# Patient Record
Sex: MALE | URBAN - METROPOLITAN AREA
[De-identification: names, ages, dates, MRNs, and addresses within clinical notes are randomized per-mention and may not be internally consistent; named-entity substitution may affect disease eponyms.]

---

## 2020-11-06 ENCOUNTER — TELEPHONE (OUTPATIENT)
Dept: PRIMARY CARE CLINIC | Age: 60
End: 2020-11-06

## 2020-11-06 ENCOUNTER — VIRTUAL VISIT (OUTPATIENT)
Dept: PRIMARY CARE CLINIC | Age: 60
End: 2020-11-06

## 2020-11-06 ENCOUNTER — OFFICE VISIT (OUTPATIENT)
Dept: PRIMARY CARE CLINIC | Age: 60
End: 2020-11-06

## 2020-11-06 VITALS — BODY MASS INDEX: 40.6 KG/M2 | HEIGHT: 71 IN | WEIGHT: 290 LBS

## 2020-11-06 PROBLEM — Z13.220 SCREENING FOR LIPID DISORDERS: Status: ACTIVE | Noted: 2020-11-06

## 2020-11-06 PROBLEM — Z12.5 SCREENING FOR PROSTATE CANCER: Status: ACTIVE | Noted: 2020-11-06

## 2020-11-06 PROBLEM — E66.01 OBESITY, CLASS III, BMI 40-49.9 (MORBID OBESITY) (HCC): Status: ACTIVE | Noted: 2020-11-06

## 2020-11-06 PROBLEM — R63.5 WEIGHT GAIN: Status: ACTIVE | Noted: 2020-11-06

## 2020-11-06 PROBLEM — R73.03 PREDIABETES: Status: ACTIVE | Noted: 2018-06-07

## 2020-11-06 PROBLEM — I10 ESSENTIAL HYPERTENSION: Status: ACTIVE | Noted: 2020-11-06

## 2020-11-06 PROBLEM — R35.1 FREQUENT URINATION AT NIGHT: Status: ACTIVE | Noted: 2020-11-06

## 2020-11-06 PROCEDURE — 99204 OFFICE O/P NEW MOD 45 MIN: CPT | Performed by: INTERNAL MEDICINE

## 2020-11-06 PROCEDURE — 99211 OFF/OP EST MAY X REQ PHY/QHP: CPT | Performed by: NURSE PRACTITIONER

## 2020-11-06 RX ORDER — CHOLECALCIFEROL (VITAMIN D3) 125 MCG
1 CAPSULE ORAL DAILY
Qty: 30 TABLET | Refills: 5 | Status: SHIPPED | OUTPATIENT
Start: 2020-11-06

## 2020-11-06 RX ORDER — HYDROCHLOROTHIAZIDE 25 MG/1
25 TABLET ORAL DAILY
COMMUNITY
End: 2020-11-06 | Stop reason: SDUPTHER

## 2020-11-06 RX ORDER — HYDROCHLOROTHIAZIDE 25 MG/1
25 TABLET ORAL DAILY
Qty: 30 TABLET | Refills: 0 | Status: SHIPPED | OUTPATIENT
Start: 2020-11-06

## 2020-11-06 SDOH — HEALTH STABILITY: PHYSICAL HEALTH: ON AVERAGE, HOW MANY MINUTES DO YOU ENGAGE IN EXERCISE AT THIS LEVEL?: 0 MIN

## 2020-11-06 SDOH — HEALTH STABILITY: MENTAL HEALTH: HOW MANY STANDARD DRINKS CONTAINING ALCOHOL DO YOU HAVE ON A TYPICAL DAY?: 1 OR 2

## 2020-11-06 SDOH — HEALTH STABILITY: MENTAL HEALTH: HOW OFTEN DO YOU HAVE A DRINK CONTAINING ALCOHOL?: 2-3 TIMES A WEEK

## 2020-11-06 SDOH — HEALTH STABILITY: PHYSICAL HEALTH: ON AVERAGE, HOW MANY DAYS PER WEEK DO YOU ENGAGE IN MODERATE TO STRENUOUS EXERCISE (LIKE A BRISK WALK)?: 0 DAYS

## 2020-11-06 ASSESSMENT — ENCOUNTER SYMPTOMS
WHEEZING: 0
EYE PAIN: 0
EYE DISCHARGE: 0
SINUS PAIN: 0
BLOOD IN STOOL: 0
SHORTNESS OF BREATH: 0
VOMITING: 0
TROUBLE SWALLOWING: 0
ABDOMINAL PAIN: 0
SINUS PRESSURE: 0
SORE THROAT: 0
NAUSEA: 0
COUGH: 0
CHEST TIGHTNESS: 0
RHINORRHEA: 0

## 2020-11-06 ASSESSMENT — PATIENT HEALTH QUESTIONNAIRE - PHQ9
2. FEELING DOWN, DEPRESSED OR HOPELESS: 0
SUM OF ALL RESPONSES TO PHQ QUESTIONS 1-9: 0
SUM OF ALL RESPONSES TO PHQ9 QUESTIONS 1 & 2: 0
SUM OF ALL RESPONSES TO PHQ QUESTIONS 1-9: 0
1. LITTLE INTEREST OR PLEASURE IN DOING THINGS: 0
SUM OF ALL RESPONSES TO PHQ QUESTIONS 1-9: 0

## 2020-11-06 NOTE — TELEPHONE ENCOUNTER
I spoke with Filomena Boogie and he was not looking to establish as a new patient. His job wanted him to get a Covid test that he just started and he has yet to receive his insurance card and he thought the test was free. He claims that this office is too far for him to come.

## 2020-11-06 NOTE — PATIENT INSTRUCTIONS
Fasting blood work on 11/16/2020 and follow-up in office visit on 11/19/2020. Vitamin D 2000 units every day. Buy blood pressure machine with arm cuff large size due to his size and check blood pressure twice a day and bring it back to the office next time. Hypertension    Keep low sodium diet. Avoid potato chips, pretzels, sauerkraut , ham , sausage, munguia , salty crackers , salty french fries, salty nuts, salty popcorn etc.  Use only low sodium soups. No salted canned vegetables. Use fresh or frozen vegetables. No salt shaker use. Avoid weight gain. Regular exercise program.  Keep taking blood pressure medications regularly. If blood pressure staying above 130/85 or having side effects with blood pressure medications, then bring the blood pressure and pulse recorded twice a day to an appointment sooner than scheduled one. Must lose weight with lean meats and low-carb diet. Shingles vaccine and flu vaccine from pharmacy. Colonoscopy screening needs to be set up at next visit. Discussed use, benefit, and side effects of prescribed medications. Barriers to compliance discussed. All patient questions answered. Pt voiced understanding. IF YOU NEED A PRESCRIPTION REFILL, THEN PLEASE GIVE US THREE WORKING DAYS TO REFILL A PRESCRIPTION.

## 2020-11-06 NOTE — PROGRESS NOTES
ear pain, nosebleeds, rhinorrhea, sinus pressure, sinus pain, sore throat and trouble swallowing. Eyes: Negative for pain and discharge. Respiratory: Negative for cough, chest tightness, shortness of breath and wheezing. Cardiovascular: Negative for chest pain, palpitations and leg swelling. Gastrointestinal: Negative for abdominal pain, blood in stool, nausea and vomiting. Endocrine: Negative for polydipsia and polyphagia. Genitourinary: Negative for difficulty urinating, enuresis, flank pain and hematuria. Musculoskeletal: Negative for myalgias. Knee pain off and on   Skin: Negative for rash. Neurological: Negative for facial asymmetry, weakness, light-headedness, numbness and headaches. Psychiatric/Behavioral: Negative for confusion. No current outpatient medications on file prior to visit. No current facility-administered medications on file prior to visit. No Known Allergies  Past Medical History:   Diagnosis Date    HTN (hypertension) 2019     Past Surgical History:   Procedure Laterality Date    SHOULDER SURGERY  1999    cyst removal      Social History     Tobacco Use    Smoking status: Never Smoker    Smokeless tobacco: Never Used   Substance Use Topics    Alcohol use: Yes     Frequency: 2-3 times a week     Drinks per session: 1 or 2     Binge frequency: Never      Family History   Problem Relation Age of Onset    Other Mother 76        URI , PE    Stroke Father 80    High Blood Pressure Father         Vitals:    11/06/20 1204   Weight: 290 lb (131.5 kg)   Height: 5' 11\" (1.803 m)     Estimated body mass index is 40.45 kg/m² as calculated from the following:    Height as of this encounter: 5' 11\" (1.803 m). Weight as of this encounter: 290 lb (131.5 kg). Physical Exam  Constitutional:       Appearance: Normal appearance.    HENT:      Nose: Nose normal.   Pulmonary:      Effort: Pulmonary effort is normal.   Neurological:      General: No focal deficit present. Mental Status: He is alert. Psychiatric:         Mood and Affect: Mood normal.         Behavior: Behavior normal.         Thought Content: Thought content normal.         Judgment: Judgment normal.         ASSESSMENT/PLAN:  1. Essential hypertension    - hydroCHLOROthiazide (HYDRODIURIL) 25 MG tablet; Take 1 tablet by mouth daily  Dispense: 30 tablet; Refill: 0  - Basic Metabolic Panel; Future  - Microalbumin / Creatinine Urine Ratio; Future    2. Frequent urination at night  No caffeine late afternoon  - Urinalysis Reflex to Culture; Future    3. Obesity, Class III, BMI 40-49.9 (morbid obesity) ([facility] [address]  Must lose weight  - Hepatic Function Panel; Future    4. Prediabetes    - Hemoglobin A1C; Future  - Microalbumin / Creatinine Urine Ratio; Future    5. Vitamin D deficiency    - Cholecalciferol (VITAMIN D3) 50 MCG (2000 UT) TABS; Take 1 tablet by mouth daily  Dispense: 30 tablet; Refill: 5  - VITAMIN D 25 HYDROXY; Future    6. Screening for prostate cancer    - Psa screening; Future    7. Screening for lipid disorders    - Lipid Panel; Future    8. Weight gain    - TSH with Reflex; Future  - T4, Free; Future    Darleen Valentin is a 64 y.o. male being evaluated by a Virtual Visit (video visit) encounter to address concerns as mentioned above. A caregiver was present when appropriate. Due to this being a TeleHealth encounter (During [facility]- public health emergency), evaluation of the following organ systems was limited: Vitals/Constitutional/EENT/Resp/CV/GI//MS/Neuro/Skin/Heme-Lymph-Imm. Pursuant to the emergency declaration under the [address] [place] [address], [address] [place] [address] authority and the [employer] and [employer] Act, this Virtual Visit was conducted with patient's (and/or legal guardian's) consent, to reduce the patient's risk of exposure to COVID-19 and provide necessary medical care.   The patient (and/or legal guardian) has also been advised to contact this office for worsening conditions or problems, and seek emergency medical treatment and/or call 911 if deemed necessary. Patient identification was verified at the start of the visit: Yes    Total time spent for this encounter: 33 minutes and 16 seconds    Services were provided through a video synchronous discussion virtually to substitute for in-person clinic visit. Patient and provider were located at their individual homes. --Nba Clark MD on 11/6/2020 at 12:37 PM    An electronic signature was used to authenticate this note. Return in about 13 days (around 11/19/2020) for blood pressure , labs--office visit. Patient Instructions   Fasting blood work on 11/16/2020 and follow-up in office visit on 11/19/2020. Vitamin D 2000 units every day. Buy blood pressure machine with arm cuff large size due to his size and check blood pressure twice a day and bring it back to the office next time. Hypertension    Keep low sodium diet. Avoid potato chips, pretzels, sauerkraut , ham , sausage, munguia , salty crackers , salty french fries, salty nuts, salty popcorn etc.  Use only low sodium soups. No salted canned vegetables. Use fresh or frozen vegetables. No salt shaker use. Avoid weight gain. Regular exercise program.  Keep taking blood pressure medications regularly. If blood pressure staying above 130/85 or having side effects with blood pressure medications, then bring the blood pressure and pulse recorded twice a day to an appointment sooner than scheduled one. Must lose weight with lean meats and low-carb diet. Shingles vaccine and flu vaccine from pharmacy. Colonoscopy screening needs to be set up at next visit. Discussed use, benefit, and side effects of prescribed medications. Barriers to compliance discussed. All patient questions answered. Pt voiced understanding.     IF YOU NEED A PRESCRIPTION REFILL, THEN PLEASE GIVE US THREE WORKING DAYS TO REFILL A PRESCRIPTION. Electronically signed by Anabella Browning MD on 11/6/2020 at 12:37 PM     This dictation was generated by voice recognition computer software. Although all attempts are made to edit the dictation for accuracy, there may be errors in the transcription that are not intended.

## 2020-11-06 NOTE — TELEPHONE ENCOUNTER
I called Eric Tran and could not leave a message for patient to schedule F\U appt. Of get insurance information from patient.  I will mail out his AVS.

## 2020-11-09 LAB — SARS-COV-2, NAA: NOT DETECTED

## 2020-11-09 NOTE — TELEPHONE ENCOUNTER
Patient states that he took the [place] test on Friday at [facility] have not received the results.

## 2020-11-09 NOTE — TELEPHONE ENCOUNTER
He can look up on Covid 19 testing sites which are free--online-as per [facility] or [facility] testing sites. I can do a virtual visit to schedule COVID-19 test if he wants. He can test on any [facility] facility closest to him.

## 2020-11-10 ENCOUNTER — TELEPHONE (OUTPATIENT)
Dept: PRIMARY CARE CLINIC | Age: 60
End: 2020-11-10

## 2020-11-11 ENCOUNTER — TELEPHONE (OUTPATIENT)
Dept: PRIMARY CARE CLINIC | Age: 60
End: 2020-11-11

## 2020-11-13 NOTE — TELEPHONE ENCOUNTER
Patient returning call. He is needing his covid test results because his workplace keeps bothering him for the results. He said everyone else at his workplace has gotten their results back.

## 2020-11-13 NOTE — TELEPHONE ENCOUNTER
Spoke with Marco Biswas @ [phone] and advised of his negative covid test. Pt needs a letter for work stating that it was negative. Will be calling back with a fax # and who it will be faxed too. Letter created, printed and on Dr. Eulalia Whitman desk for signature.

## 2020-11-16 ENCOUNTER — TELEPHONE (OUTPATIENT)
Dept: PRIMARY CARE CLINIC | Age: 60
End: 2020-11-16

## 2020-11-16 NOTE — TELEPHONE ENCOUNTER
I lm for pt to give me a call. I need his fax # to ax over his letter for his Covid-19 results to his job.

## 2020-12-06 PROBLEM — Z12.5 SCREENING FOR PROSTATE CANCER: Status: RESOLVED | Noted: 2020-11-06 | Resolved: 2020-12-06

## 2020-12-06 PROBLEM — Z13.220 SCREENING FOR LIPID DISORDERS: Status: RESOLVED | Noted: 2020-11-06 | Resolved: 2020-12-06

## 2021-01-16 DIAGNOSIS — I10 ESSENTIAL HYPERTENSION: ICD-10-CM

## 2021-01-18 RX ORDER — HYDROCHLOROTHIAZIDE 25 MG/1
TABLET ORAL
Qty: 30 TABLET | Refills: 0 | OUTPATIENT
Start: 2021-01-18